# Patient Record
Sex: FEMALE | Race: WHITE | NOT HISPANIC OR LATINO | URBAN - METROPOLITAN AREA
[De-identification: names, ages, dates, MRNs, and addresses within clinical notes are randomized per-mention and may not be internally consistent; named-entity substitution may affect disease eponyms.]

---

## 2017-12-25 ENCOUNTER — EMERGENCY (EMERGENCY)
Facility: HOSPITAL | Age: 62
LOS: 1 days | End: 2017-12-25
Payer: COMMERCIAL

## 2017-12-25 PROCEDURE — 74177 CT ABD & PELVIS W/CONTRAST: CPT | Mod: 26

## 2017-12-25 PROCEDURE — 99284 EMERGENCY DEPT VISIT MOD MDM: CPT

## 2018-02-13 ENCOUNTER — OUTPATIENT (OUTPATIENT)
Dept: OUTPATIENT SERVICES | Facility: HOSPITAL | Age: 63
LOS: 1 days | End: 2018-02-13

## 2019-03-29 ENCOUNTER — APPOINTMENT (OUTPATIENT)
Dept: OBGYN | Facility: CLINIC | Age: 64
End: 2019-03-29
Payer: COMMERCIAL

## 2019-03-29 VITALS
SYSTOLIC BLOOD PRESSURE: 126 MMHG | BODY MASS INDEX: 36.96 KG/M2 | DIASTOLIC BLOOD PRESSURE: 80 MMHG | WEIGHT: 230 LBS | HEIGHT: 66 IN

## 2019-03-29 PROCEDURE — 99396 PREV VISIT EST AGE 40-64: CPT

## 2019-03-29 NOTE — DISCUSSION/SUMMARY
[FreeTextEntry1] : a64 years old white female came to the office for annual exam physical and pelvic exam within normal limits Pap smear done will send the patient for bone density and mammogram I spent 25 minutes with the patient

## 2019-04-04 LAB — CYTOLOGY CVX/VAG DOC THIN PREP: NORMAL

## 2019-05-13 ENCOUNTER — APPOINTMENT (OUTPATIENT)
Dept: RADIOLOGY | Facility: CLINIC | Age: 64
End: 2019-05-13

## 2019-05-13 ENCOUNTER — APPOINTMENT (OUTPATIENT)
Dept: MAMMOGRAPHY | Facility: CLINIC | Age: 64
End: 2019-05-13
Payer: COMMERCIAL

## 2019-05-13 PROCEDURE — 77063 BREAST TOMOSYNTHESIS BI: CPT

## 2019-05-13 PROCEDURE — 77080 DXA BONE DENSITY AXIAL: CPT

## 2019-05-13 PROCEDURE — 77067 SCR MAMMO BI INCL CAD: CPT

## 2019-06-11 ENCOUNTER — OUTPATIENT (OUTPATIENT)
Dept: OUTPATIENT SERVICES | Facility: HOSPITAL | Age: 64
LOS: 1 days | End: 2019-06-11

## 2019-06-17 ENCOUNTER — OUTPATIENT (OUTPATIENT)
Dept: OUTPATIENT SERVICES | Facility: HOSPITAL | Age: 64
LOS: 1 days | End: 2019-06-17

## 2020-06-11 ENCOUNTER — APPOINTMENT (OUTPATIENT)
Dept: MAMMOGRAPHY | Facility: CLINIC | Age: 65
End: 2020-06-11
Payer: COMMERCIAL

## 2020-06-11 PROCEDURE — 77067 SCR MAMMO BI INCL CAD: CPT

## 2020-06-11 PROCEDURE — 77063 BREAST TOMOSYNTHESIS BI: CPT

## 2020-06-26 ENCOUNTER — OUTPATIENT (OUTPATIENT)
Dept: OUTPATIENT SERVICES | Facility: HOSPITAL | Age: 65
LOS: 1 days | End: 2020-06-26

## 2022-05-19 ENCOUNTER — APPOINTMENT (OUTPATIENT)
Dept: ORTHOPEDIC SURGERY | Facility: CLINIC | Age: 67
End: 2022-05-19
Payer: MEDICARE

## 2022-05-19 VITALS — BODY MASS INDEX: 36.96 KG/M2 | HEIGHT: 66 IN | WEIGHT: 230 LBS

## 2022-05-19 DIAGNOSIS — I10 ESSENTIAL (PRIMARY) HYPERTENSION: ICD-10-CM

## 2022-05-19 DIAGNOSIS — E78.00 PURE HYPERCHOLESTEROLEMIA, UNSPECIFIED: ICD-10-CM

## 2022-05-19 PROCEDURE — 99203 OFFICE O/P NEW LOW 30 MIN: CPT

## 2022-05-19 PROCEDURE — 73630 X-RAY EXAM OF FOOT: CPT | Mod: LT

## 2022-05-19 RX ORDER — LISINOPRIL 30 MG/1
TABLET ORAL
Refills: 0 | Status: ACTIVE | COMMUNITY

## 2022-05-19 RX ORDER — SIMVASTATIN 80 MG/1
TABLET, FILM COATED ORAL
Refills: 0 | Status: ACTIVE | COMMUNITY

## 2022-06-07 ENCOUNTER — FORM ENCOUNTER (OUTPATIENT)
Age: 67
End: 2022-06-07

## 2022-06-07 NOTE — DISCUSSION/SUMMARY
[de-identified] : DISCUSSED TREATMENT VIA SURGERY- EXTENSOR TENOTOMY WITH A CAPSULOTOMY 4TH AND 5TH TOES LEFT FOOT \par AS WELL AS REMODELING OF 5TH MPJ DUE TO PAIN LATERALLY AT THE 5TH MPJ. \par PROPOSED SURGERY- HAMMERTOE REPAIR 4TH AND 5TH LEFT FOOT\par                                       REPAIR OF BUNIONETTE WITH CHYLECTOMY LEFT FOOT.

## 2022-06-07 NOTE — REASON FOR VISIT
[FreeTextEntry2] : LEFT FOOT BUNIONETTE EVALUATION .  PREVIOUS TREATMENT-CALLOUS REMOVER AND PADS.   WORK- AT [employer]

## 2022-06-07 NOTE — PHYSICAL EXAM
[2nd] : 2nd [3rd] : 3rd [4th] : 4th [Mild] : mild swelling of toe(s) [Severe] : severe swelling of calf [5th] : 5th [NL 30)] : inversion 30 degrees [NL (40)] : MTP joint DF 40 degrees [NL (20)] : MTP joint PF 20 degrees [5___] : [facility] 5[unfilled]/5 [2+] : posterior tibialis pulse: 2+ [Left] : left foot [There are no fractures, subluxations or dislocations. No significant abnormalities are seen] : There are no fractures, subluxations or dislocations. No significant abnormalities are seen [] : non-antalgic [FreeTextEntry3] : PAIN WITH PALPATION OF 5TH MPJ \par SEVERE LYMPHEDEMA NOTED(USES PUMP). [de-identified] : TAILOR'S BUNION DEFORMITY NOTED.  SEVERE HAMMERTOE DEFORMITIES 2-5. \par NO FRACTURES OR ARTHRITIC CHANGES NOTED.  [de-identified] : SEVERE HAMMERTOES NOTED.

## 2022-06-07 NOTE — ASSESSMENT
[FreeTextEntry1] : I DISCUSSED TREATMENT OPTIONS WHICH INCLUDES, WIDER SHOES, NSAIDS, TYLENOL, WARM COMPRESSES. STEROID INJECTIONS, AND SURGICAL CORRECTION. \par PADDING ALSO DISCUSSED.

## 2022-07-04 ENCOUNTER — FORM ENCOUNTER (OUTPATIENT)
Age: 67
End: 2022-07-04

## 2022-07-14 ENCOUNTER — APPOINTMENT (OUTPATIENT)
Dept: ORTHOPEDIC SURGERY | Facility: AMBULATORY MEDICAL SERVICES | Age: 67
End: 2022-07-14

## 2022-07-24 ENCOUNTER — FORM ENCOUNTER (OUTPATIENT)
Age: 67
End: 2022-07-24

## 2022-07-25 ENCOUNTER — OUTPATIENT (OUTPATIENT)
Dept: OUTPATIENT SERVICES | Facility: HOSPITAL | Age: 67
LOS: 1 days | End: 2022-07-25

## 2022-07-25 PROCEDURE — 93010 ELECTROCARDIOGRAM REPORT: CPT

## 2022-07-28 DIAGNOSIS — Z01.812 ENCOUNTER FOR PREPROCEDURAL LABORATORY EXAMINATION: ICD-10-CM

## 2022-07-28 DIAGNOSIS — M20.42 OTHER HAMMER TOE(S) (ACQUIRED), LEFT FOOT: ICD-10-CM

## 2022-07-28 DIAGNOSIS — M21.622 BUNIONETTE OF LEFT FOOT: ICD-10-CM

## 2022-07-28 DIAGNOSIS — Z01.810 ENCOUNTER FOR PREPROCEDURAL CARDIOVASCULAR EXAMINATION: ICD-10-CM

## 2022-07-29 RX ORDER — OXYCODONE 5 MG/1
5 TABLET ORAL
Qty: 28 | Refills: 0 | Status: ACTIVE | COMMUNITY
Start: 2022-07-29 | End: 1900-01-01

## 2022-07-29 RX ORDER — KETOROLAC TROMETHAMINE 10 MG/1
10 TABLET, FILM COATED ORAL EVERY 6 HOURS
Qty: 20 | Refills: 0 | Status: ACTIVE | COMMUNITY
Start: 2022-07-29 | End: 1900-01-01

## 2022-08-08 ENCOUNTER — OUTPATIENT (OUTPATIENT)
Dept: OUTPATIENT SERVICES | Facility: HOSPITAL | Age: 67
LOS: 1 days | End: 2022-08-08

## 2022-08-08 ENCOUNTER — APPOINTMENT (OUTPATIENT)
Dept: ORTHOPEDIC SURGERY | Facility: HOSPITAL | Age: 67
End: 2022-08-08

## 2022-08-08 PROCEDURE — 28308 INCISION OF METATARSAL: CPT | Mod: T4

## 2022-08-08 PROCEDURE — 28234 INCISION OF FOOT TENDON: CPT | Mod: T3

## 2022-08-14 DIAGNOSIS — M21.622 BUNIONETTE OF LEFT FOOT: ICD-10-CM

## 2022-08-14 DIAGNOSIS — E66.01 MORBID (SEVERE) OBESITY DUE TO EXCESS CALORIES: ICD-10-CM

## 2022-08-14 DIAGNOSIS — I10 ESSENTIAL (PRIMARY) HYPERTENSION: ICD-10-CM

## 2022-08-14 DIAGNOSIS — M20.42 OTHER HAMMER TOE(S) (ACQUIRED), LEFT FOOT: ICD-10-CM

## 2022-08-17 ENCOUNTER — APPOINTMENT (OUTPATIENT)
Dept: PODIATRY | Facility: CLINIC | Age: 67
End: 2022-08-17

## 2022-08-17 PROCEDURE — 99024 POSTOP FOLLOW-UP VISIT: CPT

## 2022-08-17 NOTE — PHYSICAL EXAM
[de-identified] : MINIMAL EDEMa.\par no sign of infections.\par no drainage.\par sutures intact no temperature elevation

## 2022-08-17 NOTE — ASSESSMENT
[FreeTextEntry1] : dsd applied\par patient can remove bANDAGE AND CLEAN ON MONDAY.\par CAN EXERCISE MONDAY.\par WALK AS TOLERATED. \par CONTINUE WITH POST OPERATIVE SHOE.

## 2022-08-25 ENCOUNTER — APPOINTMENT (OUTPATIENT)
Dept: PODIATRY | Facility: CLINIC | Age: 67
End: 2022-08-25

## 2022-08-25 PROCEDURE — 99024 POSTOP FOLLOW-UP VISIT: CPT

## 2022-08-25 NOTE — ASSESSMENT
[FreeTextEntry1] : BATHING ALLOWED AS WELL AS EDEMA PUMP.  CONTINUE WITH SX SHOE AND OR OPEN TOE SHOES OR SANDALS.

## 2022-09-15 ENCOUNTER — APPOINTMENT (OUTPATIENT)
Dept: PODIATRY | Facility: CLINIC | Age: 67
End: 2022-09-15

## 2022-09-15 DIAGNOSIS — M21.622 BUNIONETTE OF LEFT FOOT: ICD-10-CM

## 2022-09-15 DIAGNOSIS — M79.672 PAIN IN LEFT FOOT: ICD-10-CM

## 2022-09-15 DIAGNOSIS — M20.42 OTHER HAMMER TOE(S) (ACQUIRED), LEFT FOOT: ICD-10-CM

## 2022-09-15 PROCEDURE — 73630 X-RAY EXAM OF FOOT: CPT | Mod: LT

## 2022-09-15 PROCEDURE — 99024 POSTOP FOLLOW-UP VISIT: CPT

## 2022-09-15 NOTE — PHYSICAL EXAM
[Left] : left foot [No loss of surgical correlation. Bony alignment acceptable. Hardware in appropriate position] : No loss of surgical correlation. Bony alignment acceptable. Hardware in appropriate position [] : no sero-sanguinous drainage [de-identified] : HEALING OSTEOTOMY 5TH MET GOOD ALIGNMENT

## 2022-09-15 NOTE — REASON FOR VISIT
[FreeTextEntry2] : LEFT FOOT BUNIONETTE AND HAMMERTOE   POST OP CARE SX 8/8/2022. FEELING BETTER BUT AT TIMES PATIENT IS FEELING TINGLING AND STIFF. PATIENT HAVE BEEN USING HER EXERCISE BIKE, LYMPHOEDEMA PUMP, AND ICE AND HEAT.

## 2022-11-19 ENCOUNTER — APPOINTMENT (OUTPATIENT)
Dept: MAMMOGRAPHY | Facility: CLINIC | Age: 67
End: 2022-11-19

## 2022-11-19 PROCEDURE — 77063 BREAST TOMOSYNTHESIS BI: CPT

## 2022-11-19 PROCEDURE — 77067 SCR MAMMO BI INCL CAD: CPT

## 2022-11-29 ENCOUNTER — RESULT REVIEW (OUTPATIENT)
Age: 67
End: 2022-11-29

## 2022-12-20 ENCOUNTER — APPOINTMENT (OUTPATIENT)
Dept: RADIOLOGY | Facility: CLINIC | Age: 67
End: 2022-12-20

## 2023-03-17 ENCOUNTER — APPOINTMENT (OUTPATIENT)
Dept: RADIOLOGY | Facility: CLINIC | Age: 68
End: 2023-03-17
Payer: MEDICARE

## 2023-03-17 ENCOUNTER — RESULT REVIEW (OUTPATIENT)
Age: 68
End: 2023-03-17

## 2023-03-17 PROCEDURE — 77085 DXA BONE DENSITY AXL VRT FX: CPT

## 2023-03-20 ENCOUNTER — NON-APPOINTMENT (OUTPATIENT)
Age: 68
End: 2023-03-20

## 2023-04-14 ENCOUNTER — OFFICE (OUTPATIENT)
Dept: URBAN - METROPOLITAN AREA CLINIC 9 | Facility: CLINIC | Age: 68
Setting detail: OPHTHALMOLOGY
End: 2023-04-14
Payer: MEDICARE

## 2023-04-14 DIAGNOSIS — H01.004: ICD-10-CM

## 2023-04-14 DIAGNOSIS — H57.813: ICD-10-CM

## 2023-04-14 DIAGNOSIS — D31.40: ICD-10-CM

## 2023-04-14 DIAGNOSIS — H35.033: ICD-10-CM

## 2023-04-14 DIAGNOSIS — H16.223: ICD-10-CM

## 2023-04-14 DIAGNOSIS — H01.001: ICD-10-CM

## 2023-04-14 DIAGNOSIS — H18.413: ICD-10-CM

## 2023-04-14 DIAGNOSIS — H25.13: ICD-10-CM

## 2023-04-14 DIAGNOSIS — H02.831: ICD-10-CM

## 2023-04-14 DIAGNOSIS — H40.013: ICD-10-CM

## 2023-04-14 DIAGNOSIS — H43.813: ICD-10-CM

## 2023-04-14 DIAGNOSIS — H11.153: ICD-10-CM

## 2023-04-14 PROBLEM — H02.834 DERMATOCHALASIS; RIGHT UPPER LID, LEFT UPPER LID: Status: ACTIVE | Noted: 2023-04-14

## 2023-04-14 PROCEDURE — 92014 COMPRE OPH EXAM EST PT 1/>: CPT | Performed by: OPHTHALMOLOGY

## 2023-04-14 ASSESSMENT — REFRACTION_CURRENTRX
OS_VPRISM_DIRECTION: BF
OS_CYLINDER: +0.50
OS_OVR_VA: 20/
OD_VPRISM_DIRECTION: BF
OD_OVR_VA: 20/
OD_OVR_VA: 20/
OS_SPHERE: +1.50
OD_ADD: +2.75
OS_AXIS: 012
OS_ADD: +2.75
OS_SPHERE: +1.75
OD_VPRISM_DIRECTION: BF
OS_VPRISM_DIRECTION: BF
OS_CYLINDER: +0.50
OS_OVR_VA: 20/
OS_AXIS: 005
OS_ADD: +2.50
OD_CYLINDER: SPH
OD_ADD: +2.50
OD_SPHERE: +1.75
OD_CYLINDER: SPH
OD_SPHERE: +1.75

## 2023-04-14 ASSESSMENT — AXIALLENGTH_DERIVED
OS_AL: 21.6206
OS_AL: 21.5391
OD_AL: 21.7393
OD_AL: 21.6569
OS_AL: 21.5391

## 2023-04-14 ASSESSMENT — REFRACTION_MANIFEST
OS_AXIS: 005
OS_ADD: +2.50
OD_SPHERE: +1.75
OS_ADD: +2.50
OS_CYLINDER: +0.50
OS_VA1: 20/25
OS_VA2: 20/20(J1+)
OD_ADD: +2.50
OS_CYLINDER: +0.50
OD_VA2: 20/20(J1+)
OD_CYLINDER: +0.25
OD_SPHERE: +1.75
OD_CYLINDER: SPH
OS_SPHERE: +1.75
OD_ADD: +2.50
OD_VA1: 20/20-
OS_VA2: 20/20(J1+)
OS_SPHERE: +1.75
OS_AXIS: 150
OU_VA: 20/20
OS_VA1: 20/20
OD_VA2: 20/20(J1+)
OU_VA: 20/20-
OD_VA1: 20/20-2
OD_AXIS: 005

## 2023-04-14 ASSESSMENT — LID EXAM ASSESSMENTS
OS_BROW_PTOSIS: MILD
OS_COMMENTS: DEMODEX
OD_COMMENTS: DEMODEX
OD_BLEPHARITIS: RUL 2+
OS_BLEPHARITIS: LUL 2+
OD_BROW_PTOSIS: MILD

## 2023-04-14 ASSESSMENT — REFRACTION_AUTOREFRACTION
OS_SPHERE: +1.50
OD_AXIS: 003
OD_SPHERE: +1.00
OS_CYLINDER: +0.50
OD_CYLINDER: +1.25
OS_AXIS: 148

## 2023-04-14 ASSESSMENT — KERATOMETRY
OD_K1POWER_DIOPTERS: 47.25
METHOD_AUTO_MANUAL: AUTO
OS_K1POWER_DIOPTERS: 47.50
OD_AXISANGLE_DEGREES: 090
OS_AXISANGLE_DEGREES: 090
OD_K2POWER_DIOPTERS: 47.25
OS_K2POWER_DIOPTERS: 47.50

## 2023-04-14 ASSESSMENT — TEAR BREAK UP TIME (TBUT)
OD_TBUT: 3 SECS
OS_TBUT: 3 SECS

## 2023-04-14 ASSESSMENT — CONFRONTATIONAL VISUAL FIELD TEST (CVF)
OS_FINDINGS: FULL
OD_FINDINGS: FULL

## 2023-04-14 ASSESSMENT — LID POSITION - DERMATOCHALASIS
OS_DERMATOCHALASIS: 1+
OD_DERMATOCHALASIS: 1+

## 2023-04-14 ASSESSMENT — SPHEQUIV_DERIVED
OD_SPHEQUIV: 1.625
OS_SPHEQUIV: 2
OS_SPHEQUIV: 1.75
OS_SPHEQUIV: 2
OD_SPHEQUIV: 1.875

## 2023-04-14 ASSESSMENT — VISUAL ACUITY
OS_BCVA: 20/25
OD_BCVA: 20/30-2

## 2023-04-14 ASSESSMENT — TONOMETRY
OS_IOP_MMHG: 12
OD_IOP_MMHG: 17

## 2023-12-20 ENCOUNTER — APPOINTMENT (OUTPATIENT)
Dept: MAMMOGRAPHY | Facility: CLINIC | Age: 68
End: 2023-12-20
Payer: MEDICARE

## 2023-12-20 PROCEDURE — 77063 BREAST TOMOSYNTHESIS BI: CPT

## 2023-12-20 PROCEDURE — 77067 SCR MAMMO BI INCL CAD: CPT

## 2024-04-26 ENCOUNTER — APPOINTMENT (OUTPATIENT)
Dept: RADIOLOGY | Facility: CLINIC | Age: 69
End: 2024-04-26
Payer: MEDICARE

## 2024-04-26 PROCEDURE — 71046 X-RAY EXAM CHEST 2 VIEWS: CPT

## 2024-05-15 ENCOUNTER — APPOINTMENT (OUTPATIENT)
Dept: CT IMAGING | Facility: CLINIC | Age: 69
End: 2024-05-15
Payer: MEDICARE

## 2024-05-15 PROCEDURE — 71250 CT THORAX DX C-: CPT

## 2024-06-07 ENCOUNTER — APPOINTMENT (OUTPATIENT)
Dept: NUCLEAR MEDICINE | Facility: CLINIC | Age: 69
End: 2024-06-07
Payer: MEDICARE

## 2024-06-07 PROCEDURE — 78815 PET IMAGE W/CT SKULL-THIGH: CPT | Mod: PI

## 2024-06-07 PROCEDURE — A9552: CPT

## 2024-06-18 PROBLEM — R91.1 LUNG NODULE SEEN ON IMAGING STUDY: Status: ACTIVE | Noted: 2024-06-18

## 2024-06-21 ENCOUNTER — APPOINTMENT (OUTPATIENT)
Dept: THORACIC SURGERY | Facility: CLINIC | Age: 69
End: 2024-06-21
Payer: MEDICARE

## 2024-06-21 VITALS
HEIGHT: 66 IN | BODY MASS INDEX: 41.78 KG/M2 | TEMPERATURE: 97.9 F | SYSTOLIC BLOOD PRESSURE: 139 MMHG | HEART RATE: 82 BPM | OXYGEN SATURATION: 95 % | WEIGHT: 260 LBS | DIASTOLIC BLOOD PRESSURE: 81 MMHG

## 2024-06-21 DIAGNOSIS — R91.1 SOLITARY PULMONARY NODULE: ICD-10-CM

## 2024-06-21 PROCEDURE — 99202 OFFICE O/P NEW SF 15 MIN: CPT

## 2024-06-21 NOTE — PHYSICAL EXAM
[General Appearance - Alert] : alert [General Appearance - In No Acute Distress] : in no acute distress [Neck Appearance] : the appearance of the neck was normal [] : no respiratory distress [Respiration, Rhythm And Depth] : normal respiratory rhythm and effort [Examination Of The Chest] : the chest was normal in appearance [Chest Visual Inspection Thoracic Asymmetry] : no chest asymmetry [Abdomen Soft] : soft [Bowel Sounds] : normal bowel sounds [Abdomen Tenderness] : non-tender [Skin Color & Pigmentation] : normal skin color and pigmentation [Skin Turgor] : normal skin turgor [Oriented To Time, Place, And Person] : oriented to person, place, and time

## 2024-06-21 NOTE — ASSESSMENT
[FreeTextEntry1] : Ms. Gastelum has mildly PET avid well circumscribed left upper lobe nodule.  It has not grown in three years.  I am recommending continued surveillance given the size of the lesion with a cat scan chest in 6 months.  She will return at that time.

## 2024-06-21 NOTE — DATA REVIEWED
[FreeTextEntry1] : PET imaging on 06/07/24 - FDG avid well circumscribed 1.7 cm nodule in the lingula unchanged compared to CT from 5/15/24 and is concerning for malignancy. Differential includes carcinoid  - Few small FDG avid bilateral mediastinal lymph nodes are non specific

## 2024-07-31 ENCOUNTER — OFFICE (OUTPATIENT)
Dept: URBAN - METROPOLITAN AREA CLINIC 9 | Facility: CLINIC | Age: 69
Setting detail: OPHTHALMOLOGY
End: 2024-07-31
Payer: MEDICARE

## 2024-07-31 DIAGNOSIS — H43.813: ICD-10-CM

## 2024-07-31 DIAGNOSIS — H25.13: ICD-10-CM

## 2024-07-31 DIAGNOSIS — H01.005: ICD-10-CM

## 2024-07-31 DIAGNOSIS — H35.40: ICD-10-CM

## 2024-07-31 DIAGNOSIS — H35.033: ICD-10-CM

## 2024-07-31 DIAGNOSIS — H02.831: ICD-10-CM

## 2024-07-31 DIAGNOSIS — H02.834: ICD-10-CM

## 2024-07-31 PROCEDURE — 92014 COMPRE OPH EXAM EST PT 1/>: CPT | Performed by: OPHTHALMOLOGY

## 2024-07-31 ASSESSMENT — LID EXAM ASSESSMENTS
OS_BLEPHARITIS: LLL LUL 1+
OD_BLEPHARITIS: RLL RUL 1+

## 2024-07-31 ASSESSMENT — LID POSITION - COMMENTS
OD_COMMENTS: WITH HOODING
OS_COMMENTS: WITH HOODING

## 2024-07-31 ASSESSMENT — CONFRONTATIONAL VISUAL FIELD TEST (CVF)
OD_FINDINGS: FULL
OS_FINDINGS: FULL

## 2024-07-31 ASSESSMENT — LID POSITION - DERMATOCHALASIS
OS_DERMATOCHALASIS: 1+
OD_DERMATOCHALASIS: 1+

## 2024-08-28 ENCOUNTER — APPOINTMENT (OUTPATIENT)
Dept: ORTHOPEDIC SURGERY | Facility: CLINIC | Age: 69
End: 2024-08-28
Payer: MEDICARE

## 2024-08-28 VITALS — BODY MASS INDEX: 41.78 KG/M2 | HEIGHT: 66 IN | WEIGHT: 260 LBS

## 2024-08-28 DIAGNOSIS — M19.019 PRIMARY OSTEOARTHRITIS, UNSPECIFIED SHOULDER: ICD-10-CM

## 2024-08-28 PROCEDURE — 73030 X-RAY EXAM OF SHOULDER: CPT | Mod: 50

## 2024-08-28 PROCEDURE — 99203 OFFICE O/P NEW LOW 30 MIN: CPT

## 2024-09-02 PROBLEM — M19.019 SHOULDER ARTHRITIS: Status: ACTIVE | Noted: 2024-08-28

## 2024-09-02 NOTE — HISTORY OF PRESENT ILLNESS
[5] : 5 [1] : 2 [de-identified] : Age: 69 year F PMHx: HTN, HLD Hand Dominance: RHD Chief Complaint: bilateral shoulders pain (R>L) onset approx. February 2024 with NKI. Patient reports that her symptoms onset with no precipitating factors. Patient reports intermittent sharp pains that are worse with movement of the shoulders. Patient reports some difficulty with pushing motions or weight bearing. Denies OTC medication. Denies numbness/tingling.  Trauma: NKI Outside Imaging/Treatment: none OTC Medications: none  OT/PT: none Bracing: none Pain worse with: movement Pain better with: rest

## 2024-09-02 NOTE — IMAGING
[de-identified] : RIGHT SHOULDER EXAM +ttp anterolateral Skin intact No muscle atrophy noted Shoulder ROM   Forward flexion to 15   Abduction to 1650   ER with elbow at side to 35; contralateral shoulder 45   IR to L1  + Annamarie empty can test + Craig impingement test - Speed's test - Apprehension test - Lift-off test - Cross-body adduction test  Rotator cuff strength is 4/5 throughout Hand is warm and well perfused with brisk capillary refill throughout Motor function intact to axillary, AIN, PIN, and ulnar nerves Sensation intact axillary, median, ulnar, and superficial radial nerves Elbow and wrist motion intact and full Patient able to make a composite fist  Right shoulder radiographs with no fracture nor dislocation. GH arthrosis.   LEFT SHOULDER EXAM +ttp anterolateral Skin intact No muscle atrophy noted Shoulder ROM   Forward flexion to 15   Abduction to 1650   ER with elbow at side to 35; contralateral shoulder 45   IR to L1  + Annamarie empty can test + Craig impingement test - Speed's test - Apprehension test - Lift-off test - Cross-body adduction test  Rotator cuff strength is 4/5 throughout Hand is warm and well perfused with brisk capillary refill throughout Motor function intact to axillary, AIN, PIN, and ulnar nerves Sensation intact axillary, median, ulnar, and superficial radial nerves Elbow and wrist motion intact and full Patient able to make a composite fist  Left shoulder radiographs with no fracture nor dislocation. GH arthrosis.

## 2024-09-02 NOTE — ASSESSMENT
[FreeTextEntry1] : Bilateral Subacromial Impingement The diagnosis and treatment options were discussed at length with the patient.  The pathophysiology of shoulder impingement syndrome was discussed, including bursitis in the subacromial space causing rotator cuff inflammation and pain with overhead activities.  Treatment options discussed including observation, activity modifications, oral anti-inflammatories, physical therapy, steroid injection, and advanced imaging. All of the patient's questions were answered to their satisfaction.  Plan for follow-up in 6 weeks time, and should the symptoms kaylin, the patient may cancel.  F/u prn

## 2024-09-02 NOTE — HISTORY OF PRESENT ILLNESS
[5] : 5 [1] : 2 [de-identified] : Age: 69 year F PMHx: HTN, HLD Hand Dominance: RHD Chief Complaint: bilateral shoulders pain (R>L) onset approx. February 2024 with NKI. Patient reports that her symptoms onset with no precipitating factors. Patient reports intermittent sharp pains that are worse with movement of the shoulders. Patient reports some difficulty with pushing motions or weight bearing. Denies OTC medication. Denies numbness/tingling.  Trauma: NKI Outside Imaging/Treatment: none OTC Medications: none  OT/PT: none Bracing: none Pain worse with: movement Pain better with: rest

## 2024-09-02 NOTE — HISTORY OF PRESENT ILLNESS
[5] : 5 [1] : 2 [de-identified] : Age: 69 year F PMHx: HTN, HLD Hand Dominance: RHD Chief Complaint: bilateral shoulders pain (R>L) onset approx. February 2024 with NKI. Patient reports that her symptoms onset with no precipitating factors. Patient reports intermittent sharp pains that are worse with movement of the shoulders. Patient reports some difficulty with pushing motions or weight bearing. Denies OTC medication. Denies numbness/tingling.  Trauma: NKI Outside Imaging/Treatment: none OTC Medications: none  OT/PT: none Bracing: none Pain worse with: movement Pain better with: rest

## 2024-09-02 NOTE — IMAGING
[de-identified] : RIGHT SHOULDER EXAM +ttp anterolateral Skin intact No muscle atrophy noted Shoulder ROM   Forward flexion to 15   Abduction to 1650   ER with elbow at side to 35; contralateral shoulder 45   IR to L1  + Annamarie empty can test + Craig impingement test - Speed's test - Apprehension test - Lift-off test - Cross-body adduction test  Rotator cuff strength is 4/5 throughout Hand is warm and well perfused with brisk capillary refill throughout Motor function intact to axillary, AIN, PIN, and ulnar nerves Sensation intact axillary, median, ulnar, and superficial radial nerves Elbow and wrist motion intact and full Patient able to make a composite fist  Right shoulder radiographs with no fracture nor dislocation. GH arthrosis.   LEFT SHOULDER EXAM +ttp anterolateral Skin intact No muscle atrophy noted Shoulder ROM   Forward flexion to 15   Abduction to 1650   ER with elbow at side to 35; contralateral shoulder 45   IR to L1  + Annamarie empty can test + Craig impingement test - Speed's test - Apprehension test - Lift-off test - Cross-body adduction test  Rotator cuff strength is 4/5 throughout Hand is warm and well perfused with brisk capillary refill throughout Motor function intact to axillary, AIN, PIN, and ulnar nerves Sensation intact axillary, median, ulnar, and superficial radial nerves Elbow and wrist motion intact and full Patient able to make a composite fist  Left shoulder radiographs with no fracture nor dislocation. GH arthrosis.

## 2024-09-02 NOTE — IMAGING
[de-identified] : RIGHT SHOULDER EXAM +ttp anterolateral Skin intact No muscle atrophy noted Shoulder ROM   Forward flexion to 15   Abduction to 1650   ER with elbow at side to 35; contralateral shoulder 45   IR to L1  + Annamarie empty can test + Craig impingement test - Speed's test - Apprehension test - Lift-off test - Cross-body adduction test  Rotator cuff strength is 4/5 throughout Hand is warm and well perfused with brisk capillary refill throughout Motor function intact to axillary, AIN, PIN, and ulnar nerves Sensation intact axillary, median, ulnar, and superficial radial nerves Elbow and wrist motion intact and full Patient able to make a composite fist  Right shoulder radiographs with no fracture nor dislocation. GH arthrosis.   LEFT SHOULDER EXAM +ttp anterolateral Skin intact No muscle atrophy noted Shoulder ROM   Forward flexion to 15   Abduction to 1650   ER with elbow at side to 35; contralateral shoulder 45   IR to L1  + Annamarie empty can test + Craig impingement test - Speed's test - Apprehension test - Lift-off test - Cross-body adduction test  Rotator cuff strength is 4/5 throughout Hand is warm and well perfused with brisk capillary refill throughout Motor function intact to axillary, AIN, PIN, and ulnar nerves Sensation intact axillary, median, ulnar, and superficial radial nerves Elbow and wrist motion intact and full Patient able to make a composite fist  Left shoulder radiographs with no fracture nor dislocation. GH arthrosis.

## 2024-12-20 ENCOUNTER — APPOINTMENT (OUTPATIENT)
Facility: CLINIC | Age: 69
End: 2024-12-20

## 2025-01-17 ENCOUNTER — APPOINTMENT (OUTPATIENT)
Dept: MAMMOGRAPHY | Facility: CLINIC | Age: 70
End: 2025-01-17
Payer: MEDICARE

## 2025-01-17 PROCEDURE — 77063 BREAST TOMOSYNTHESIS BI: CPT

## 2025-01-17 PROCEDURE — 77067 SCR MAMMO BI INCL CAD: CPT

## 2025-08-25 ENCOUNTER — OFFICE (OUTPATIENT)
Dept: URBAN - METROPOLITAN AREA CLINIC 38 | Facility: CLINIC | Age: 70
Setting detail: OPHTHALMOLOGY
End: 2025-08-25
Payer: MEDICARE

## 2025-08-25 DIAGNOSIS — H35.373: ICD-10-CM

## 2025-08-25 DIAGNOSIS — H35.40: ICD-10-CM

## 2025-08-25 DIAGNOSIS — H01.002: ICD-10-CM

## 2025-08-25 DIAGNOSIS — H25.13: ICD-10-CM

## 2025-08-25 DIAGNOSIS — H35.033: ICD-10-CM

## 2025-08-25 DIAGNOSIS — H01.001: ICD-10-CM

## 2025-08-25 PROBLEM — H01.004 BLEPHARITIS; RIGHT UPPER LID, RIGHT LOWER LID, LEFT UPPER LID, LEFT LOWER LID: Status: ACTIVE | Noted: 2025-08-25

## 2025-08-25 PROBLEM — H01.005 BLEPHARITIS; RIGHT UPPER LID, RIGHT LOWER LID, LEFT UPPER LID, LEFT LOWER LID: Status: ACTIVE | Noted: 2025-08-25

## 2025-08-25 PROCEDURE — 92250 FUNDUS PHOTOGRAPHY W/I&R: CPT | Performed by: OPHTHALMOLOGY

## 2025-08-25 PROCEDURE — 92014 COMPRE OPH EXAM EST PT 1/>: CPT | Performed by: OPHTHALMOLOGY

## 2025-08-25 ASSESSMENT — REFRACTION_CURRENTRX
OD_OVR_VA: 20/
OS_SPHERE: +2.25
OD_ADD: +2.50
OS_CYLINDER: -0.50
OD_CYLINDER: SPH
OS_VPRISM_DIRECTION: BF
OD_SPHERE: +1.75
OS_ADD: +2.50
OD_VPRISM_DIRECTION: BF
OS_AXIS: 101
OS_OVR_VA: 20/

## 2025-08-25 ASSESSMENT — REFRACTION_MANIFEST
OD_ADD: +2.50
OS_VA2: 20/20(J1+)
OD_CYLINDER: SPH
OU_VA: 20/30-2
OD_VA2: 20/20(J1+)
OD_SPHERE: +1.75
OS_VA1: 20/20
OD_VA2: 20/20(J1+)
OS_AXIS: 005
OS_CYLINDER: SPH
OS_SPHERE: +1.75
OS_VA1: 20/30-
OS_CYLINDER: +0.50
OD_SPHERE: +1.00
OD_CYLINDER: SPH
OS_VA2: 20/20(J1+)
OD_VA1: 20/40
OU_VA: 20/20-
OS_ADD: +2.50
OS_SPHERE: +1.50
OD_VA1: 20/20-
OD_ADD: +2.75
OS_ADD: +2.75

## 2025-08-25 ASSESSMENT — REFRACTION_AUTOREFRACTION
OS_SPHERE: +1.75
OD_SPHERE: +2.50
OS_CYLINDER: 0.00
OD_AXIS: 101
OD_CYLINDER: -2.00
OS_AXIS: 000

## 2025-08-25 ASSESSMENT — LID EXAM ASSESSMENTS
OS_BLEPHARITIS: LLL LUL 1+
OD_BLEPHARITIS: RLL RUL 1+
OD_MEIBOMITIS: RLL RUL 1+
OS_MEIBOMITIS: LLL LUL 1+

## 2025-08-25 ASSESSMENT — VISUAL ACUITY
OD_BCVA: 20/50-
OS_BCVA: 20/50-

## 2025-08-25 ASSESSMENT — CONFRONTATIONAL VISUAL FIELD TEST (CVF)
OS_FINDINGS: FULL
OD_FINDINGS: FULL

## 2025-08-25 ASSESSMENT — KERATOMETRY
OD_K2POWER_DIOPTERS: 47.25
OS_K1POWER_DIOPTERS: 47.25
OS_K2POWER_DIOPTERS: 47.50
OD_AXISANGLE_DEGREES: 012
METHOD_AUTO_MANUAL: AUTO
OS_AXISANGLE_DEGREES: 128
OD_K1POWER_DIOPTERS: 46.50

## 2025-08-25 ASSESSMENT — LID POSITION - COMMENTS
OS_COMMENTS: WITH HOODING
OD_COMMENTS: WITH HOODING

## 2025-08-25 ASSESSMENT — LID POSITION - DERMATOCHALASIS
OS_DERMATOCHALASIS: 1+
OD_DERMATOCHALASIS: 1+

## 2025-08-25 ASSESSMENT — TONOMETRY
OS_IOP_MMHG: 13
OD_IOP_MMHG: 14